# Patient Record
Sex: FEMALE | Race: ASIAN | NOT HISPANIC OR LATINO | URBAN - METROPOLITAN AREA
[De-identification: names, ages, dates, MRNs, and addresses within clinical notes are randomized per-mention and may not be internally consistent; named-entity substitution may affect disease eponyms.]

---

## 2020-09-09 ENCOUNTER — OFFICE VISIT (OUTPATIENT)
Dept: URBAN - METROPOLITAN AREA CLINIC 118 | Facility: CLINIC | Age: 50
End: 2020-09-09
Payer: COMMERCIAL

## 2020-09-09 DIAGNOSIS — R10.32 LEFT INGUINAL PAIN: ICD-10-CM

## 2020-09-09 PROBLEM — 15629941000119104: Status: ACTIVE | Noted: 2020-09-09

## 2020-09-09 PROCEDURE — 99244 OFF/OP CNSLTJ NEW/EST MOD 40: CPT | Performed by: INTERNAL MEDICINE

## 2020-09-09 PROCEDURE — 99204 OFFICE O/P NEW MOD 45 MIN: CPT | Performed by: INTERNAL MEDICINE

## 2020-09-09 RX ORDER — TRAZODONE HYDROCHLORIDE 50 MG/1
1 TABLET AT BEDTIME AS NEEDED TABLET, FILM COATED ORAL ONCE A DAY
Status: ACTIVE | COMMUNITY

## 2020-09-09 RX ORDER — PAROXETINE HYDROCHLORIDE HEMIHYDRATE 20 MG/1
1 TABLET IN THE MORNING TABLET, FILM COATED ORAL ONCE A DAY
Status: ACTIVE | COMMUNITY

## 2020-09-09 RX ORDER — ASPIRIN 325 MG
1 CAPSULE TABLET ORAL ONCE A DAY
Status: ACTIVE | COMMUNITY

## 2020-09-09 RX ORDER — LEVOTHYROXINE SODIUM 88 UG/1
1 TABLET IN THE MORNING ON AN EMPTY STOMACH TABLET ORAL ONCE A DAY
Status: ACTIVE | COMMUNITY

## 2020-09-09 NOTE — HPI-TODAY'S VISIT:
51 yo VF referred for evaluation of L inguinal pain starting ~ 7/4/20, sharp, acute onset, lasting 30', and better only with lying down.  Seems to occur with standing and working for several hours.  BMs usu qd, no change, no GI bleed. No GI bleed.  No N/V.  Has borborygmi.  Has had CT x 2 that were reportedly negative.  Pt has had problems with BP and HR, and has seen Cardio and Neuro.  Started on Paxil and trazodone with relief.

## 2021-01-29 ENCOUNTER — OFFICE VISIT (OUTPATIENT)
Dept: URBAN - METROPOLITAN AREA CLINIC 118 | Facility: CLINIC | Age: 51
End: 2021-01-29
Payer: COMMERCIAL

## 2021-01-29 DIAGNOSIS — R14.0 BLOATING: ICD-10-CM

## 2021-01-29 DIAGNOSIS — R14.3 EXCESSIVE GAS: ICD-10-CM

## 2021-01-29 DIAGNOSIS — R19.7 DIARRHEA OF PRESUMED INFECTIOUS ORIGIN: ICD-10-CM

## 2021-01-29 DIAGNOSIS — Z83.1 FAMILY HISTORY OF HEPATITIS B: ICD-10-CM

## 2021-01-29 DIAGNOSIS — R74.8 ELEVATED LIVER ENZYMES: ICD-10-CM

## 2021-01-29 PROCEDURE — G9903 PT SCRN TBCO ID AS NON USER: HCPCS | Performed by: INTERNAL MEDICINE

## 2021-01-29 PROCEDURE — 99213 OFFICE O/P EST LOW 20 MIN: CPT | Performed by: INTERNAL MEDICINE

## 2021-01-29 PROCEDURE — G8420 CALC BMI NORM PARAMETERS: HCPCS | Performed by: INTERNAL MEDICINE

## 2021-01-29 PROCEDURE — G8482 FLU IMMUNIZE ORDER/ADMIN: HCPCS | Performed by: INTERNAL MEDICINE

## 2021-01-29 PROCEDURE — G8427 DOCREV CUR MEDS BY ELIG CLIN: HCPCS | Performed by: INTERNAL MEDICINE

## 2021-01-29 PROCEDURE — 3017F COLORECTAL CA SCREEN DOC REV: CPT | Performed by: INTERNAL MEDICINE

## 2021-01-29 RX ORDER — TRAZODONE HYDROCHLORIDE 50 MG/1
1 TABLET AT BEDTIME AS NEEDED TABLET, FILM COATED ORAL ONCE A DAY
Status: ACTIVE | COMMUNITY

## 2021-01-29 RX ORDER — LEVOTHYROXINE SODIUM 0.09 MG/1
TABLET ORAL
Qty: 0 | Refills: 0 | Status: ACTIVE | COMMUNITY
Start: 1900-01-01

## 2021-01-29 RX ORDER — PAROXETINE HYDROCHLORIDE HEMIHYDRATE 10 MG/1
1 TABLET IN THE MORNING TABLET, FILM COATED ORAL ONCE A DAY
Status: ACTIVE | COMMUNITY

## 2021-01-29 NOTE — HPI-TODAY'S VISIT:
This is a 50 yo female with pmh of hypothyroidism and anxiety here for evaluation ofr abdominal bloating/gas and abdominal discomfort.  She had colonoscopy in 2018 with Dr. Thomas and a small TA polyp was removed.  She reports having frequent loose stools over the past several weeks. Has abdominal bloating and distension with everything that she eats. No rectal bleeding.  States both of her parents had hep B and wants to be checked for hep b and liver numbers.

## 2021-01-30 ENCOUNTER — LAB OUTSIDE AN ENCOUNTER (OUTPATIENT)
Dept: URBAN - METROPOLITAN AREA CLINIC 118 | Facility: CLINIC | Age: 51
End: 2021-01-30

## 2021-01-30 LAB
A/G RATIO: 1.3
ALBUMIN: 4.1
ALKALINE PHOSPHATASE: 45
ALT (SGPT): 16
AST (SGOT): 18
BILIRUBIN, TOTAL: <0.2
BUN/CREATININE RATIO: 27
BUN: 14
CALCIUM: 9.5
CARBON DIOXIDE, TOTAL: 26
CHLORIDE: 102
CREATININE: 0.52
EGFR IF AFRICN AM: 127
EGFR IF NONAFRICN AM: 110
GLOBULIN, TOTAL: 3.1
GLUCOSE: 106
HBSAG SCREEN: NEGATIVE
HEP B CORE AB, TOT: NEGATIVE
HEP C VIRUS AB: <0.1
POTASSIUM: 3.9
PROTEIN, TOTAL: 7.2
SODIUM: 139

## 2021-02-07 LAB — GASTROINTESTINAL PATHOGEN: (no result)

## 2021-03-15 ENCOUNTER — OFFICE VISIT (OUTPATIENT)
Dept: URBAN - METROPOLITAN AREA CLINIC 118 | Facility: CLINIC | Age: 51
End: 2021-03-15

## 2021-03-19 ENCOUNTER — OFFICE VISIT (OUTPATIENT)
Dept: URBAN - METROPOLITAN AREA CLINIC 118 | Facility: CLINIC | Age: 51
End: 2021-03-19
Payer: COMMERCIAL

## 2021-03-19 DIAGNOSIS — R14.0 BLOATING: ICD-10-CM

## 2021-03-19 DIAGNOSIS — R74.8 ELEVATED LIVER ENZYMES: ICD-10-CM

## 2021-03-19 DIAGNOSIS — Z83.1 FAMILY HISTORY OF HEPATITIS B: ICD-10-CM

## 2021-03-19 DIAGNOSIS — R14.3 EXCESSIVE GAS: ICD-10-CM

## 2021-03-19 DIAGNOSIS — R19.7 DIARRHEA OF PRESUMED INFECTIOUS ORIGIN: ICD-10-CM

## 2021-03-19 PROCEDURE — 99213 OFFICE O/P EST LOW 20 MIN: CPT | Performed by: INTERNAL MEDICINE

## 2021-03-19 RX ORDER — LEVOTHYROXINE SODIUM 0.09 MG/1
TABLET ORAL
Qty: 0 | Refills: 0 | Status: ACTIVE | COMMUNITY
Start: 1900-01-01

## 2021-03-19 RX ORDER — NORTRIPTYLINE HYDROCHLORIDE 10 MG/1
1 CAPSULE CAPSULE ORAL ONCE A DAY
Qty: 30 | Refills: 6 | OUTPATIENT
Start: 2021-03-19

## 2021-03-19 RX ORDER — TRAZODONE HYDROCHLORIDE 50 MG/1
1 TABLET AT BEDTIME AS NEEDED TABLET, FILM COATED ORAL ONCE A DAY
Status: ACTIVE | COMMUNITY

## 2021-03-19 RX ORDER — PAROXETINE HYDROCHLORIDE HEMIHYDRATE 10 MG/1
1 TABLET IN THE MORNING TABLET, FILM COATED ORAL ONCE A DAY
Status: ACTIVE | COMMUNITY

## 2021-03-19 NOTE — HPI-TODAY'S VISIT:
52 yo VF with IBS and abd bloating post-prandially.  She was started on Paxil, 10 mg qHS and trazodone and is better.  No L inguinal pain.  Weight stable.  No GI bleed.  BMs multiple x/d.

## 2021-03-23 ENCOUNTER — TELEPHONE ENCOUNTER (OUTPATIENT)
Dept: URBAN - METROPOLITAN AREA CLINIC 92 | Facility: CLINIC | Age: 51
End: 2021-03-23

## 2021-06-18 ENCOUNTER — OFFICE VISIT (OUTPATIENT)
Dept: URBAN - METROPOLITAN AREA CLINIC 118 | Facility: CLINIC | Age: 51
End: 2021-06-18
Payer: COMMERCIAL

## 2021-06-18 DIAGNOSIS — R74.8 ELEVATED LIVER ENZYMES: ICD-10-CM

## 2021-06-18 DIAGNOSIS — K58.0 IRRITABLE BOWEL SYNDROME WITH DIARRHEA: ICD-10-CM

## 2021-06-18 DIAGNOSIS — R14.0 BLOATING: ICD-10-CM

## 2021-06-18 PROCEDURE — 99213 OFFICE O/P EST LOW 20 MIN: CPT | Performed by: INTERNAL MEDICINE

## 2021-06-18 RX ORDER — TRAZODONE HYDROCHLORIDE 50 MG/1
1 TABLET AT BEDTIME AS NEEDED TABLET, FILM COATED ORAL ONCE A DAY
Status: ACTIVE | COMMUNITY

## 2021-06-18 RX ORDER — LEVOTHYROXINE SODIUM 0.09 MG/1
TABLET ORAL
Qty: 0 | Refills: 0 | Status: ACTIVE | COMMUNITY
Start: 1900-01-01

## 2021-06-18 RX ORDER — PAROXETINE HYDROCHLORIDE HEMIHYDRATE 10 MG/1
1 TABLET IN THE MORNING TABLET, FILM COATED ORAL ONCE A DAY
Status: ACTIVE | COMMUNITY

## 2021-06-18 RX ORDER — NORTRIPTYLINE HYDROCHLORIDE 10 MG/1
1 CAPSULE CAPSULE ORAL ONCE A DAY
Qty: 30 | Refills: 6 | OUTPATIENT

## 2021-06-18 RX ORDER — NORTRIPTYLINE HYDROCHLORIDE 10 MG/1
1 CAPSULE CAPSULE ORAL ONCE A DAY
Qty: 30 | Refills: 6 | Status: ON HOLD | COMMUNITY
Start: 2021-03-19

## 2021-06-18 NOTE — HPI-TODAY'S VISIT:
Pt here for follow up.  Having ~ 3 soft BMs/d, usu after meals.  Notes that milk and eggs seem to cause more diarrhea.  Tried stopping trazodone, but can't sleep.  However, pt feels heaviness in eyelids and fingers.  Weight stable.

## 2021-11-10 ENCOUNTER — OFFICE VISIT (OUTPATIENT)
Dept: URBAN - METROPOLITAN AREA CLINIC 118 | Facility: CLINIC | Age: 51
End: 2021-11-10
Payer: COMMERCIAL

## 2021-11-10 DIAGNOSIS — R74.8 ELEVATED LIVER ENZYMES: ICD-10-CM

## 2021-11-10 PROCEDURE — 99213 OFFICE O/P EST LOW 20 MIN: CPT | Performed by: INTERNAL MEDICINE

## 2021-11-10 RX ORDER — TRAZODONE HYDROCHLORIDE 50 MG/1
1 TABLET AT BEDTIME AS NEEDED TABLET, FILM COATED ORAL ONCE A DAY
Status: ACTIVE | COMMUNITY

## 2021-11-10 RX ORDER — NORTRIPTYLINE HYDROCHLORIDE 10 MG/1
1 CAPSULE CAPSULE ORAL ONCE A DAY
Qty: 30 | Refills: 6 | Status: ACTIVE | COMMUNITY

## 2021-11-10 RX ORDER — LEVOTHYROXINE SODIUM 0.09 MG/1
TABLET ORAL
Qty: 0 | Refills: 0 | Status: ACTIVE | COMMUNITY
Start: 1900-01-01

## 2021-11-10 RX ORDER — PAROXETINE HYDROCHLORIDE HEMIHYDRATE 10 MG/1
1 TABLET IN THE MORNING TABLET, FILM COATED ORAL ONCE A DAY
Status: ACTIVE | COMMUNITY

## 2021-11-10 RX ORDER — FEXOFENADINE HCL 60 MG/1
1 TABLET TABLET, FILM COATED ORAL TWICE A DAY
Status: ACTIVE | COMMUNITY

## 2021-11-10 NOTE — HPI-TODAY'S VISIT:
50 yo [place] female here for elevated AST/ALT - 28/49 on 10/12/21.  Hepatitis serologies negative.  U/S of liver normal.  Pt had elevated LFTs in the past that had resolved as of 1/2021.  No abd pain, N/V, weight loss.  Pt currently getting allergy shots.  Pt is on fexofenadine.  No EtOH.

## 2021-12-23 LAB
ALBUMIN: 4.2
ALKALINE PHOSPHATASE: 45
ALT (SGPT): 11
AST (SGOT): 14
BILIRUBIN, DIRECT: <0.1
BILIRUBIN, TOTAL: 0.3
PROTEIN, TOTAL: 7.2

## 2022-01-21 ENCOUNTER — LAB OUTSIDE AN ENCOUNTER (OUTPATIENT)
Dept: URBAN - METROPOLITAN AREA CLINIC 118 | Facility: CLINIC | Age: 52
End: 2022-01-21

## 2022-01-21 ENCOUNTER — OFFICE VISIT (OUTPATIENT)
Dept: URBAN - METROPOLITAN AREA CLINIC 118 | Facility: CLINIC | Age: 52
End: 2022-01-21
Payer: COMMERCIAL

## 2022-01-21 VITALS
HEIGHT: 59 IN | TEMPERATURE: 99.3 F | HEART RATE: 87 BPM | BODY MASS INDEX: 22.86 KG/M2 | WEIGHT: 113.4 LBS | SYSTOLIC BLOOD PRESSURE: 127 MMHG | DIASTOLIC BLOOD PRESSURE: 89 MMHG

## 2022-01-21 DIAGNOSIS — R05.9 COUGH: ICD-10-CM

## 2022-01-21 DIAGNOSIS — K58.0 IRRITABLE BOWEL SYNDROME WITH DIARRHEA: ICD-10-CM

## 2022-01-21 DIAGNOSIS — R74.8 ELEVATED LIVER ENZYMES: ICD-10-CM

## 2022-01-21 PROBLEM — 49727002: Status: ACTIVE | Noted: 2022-01-21

## 2022-01-21 PROBLEM — 707724006: Status: ACTIVE | Noted: 2022-01-21

## 2022-01-21 PROCEDURE — 99213 OFFICE O/P EST LOW 20 MIN: CPT | Performed by: INTERNAL MEDICINE

## 2022-01-21 RX ORDER — BENZONATATE 200 MG/1
1 CAPSULE CAPSULE ORAL THREE TIMES A DAY
Qty: 42 CAPSULE | Refills: 1 | OUTPATIENT
Start: 2022-01-21 | End: 2022-02-18

## 2022-01-21 RX ORDER — PAROXETINE HYDROCHLORIDE HEMIHYDRATE 10 MG/1
1 TABLET IN THE MORNING TABLET, FILM COATED ORAL ONCE A DAY
Status: ACTIVE | COMMUNITY

## 2022-01-21 RX ORDER — FEXOFENADINE HCL 60 MG/1
1 TABLET TABLET, FILM COATED ORAL TWICE A DAY
Status: ON HOLD | COMMUNITY

## 2022-01-21 RX ORDER — LEVOTHYROXINE SODIUM 0.09 MG/1
TABLET ORAL
Qty: 0 | Refills: 0 | Status: ACTIVE | COMMUNITY
Start: 1900-01-01

## 2022-01-21 RX ORDER — TRAZODONE HYDROCHLORIDE 50 MG/1
1 TABLET AT BEDTIME AS NEEDED TABLET, FILM COATED ORAL ONCE A DAY
Status: ACTIVE | COMMUNITY

## 2022-01-21 RX ORDER — NORTRIPTYLINE HYDROCHLORIDE 10 MG/1
1 CAPSULE CAPSULE ORAL ONCE A DAY
Qty: 30 | Refills: 6 | Status: ON HOLD | COMMUNITY

## 2022-01-21 NOTE — HPI-TODAY'S VISIT:
51 yo VF here for evaluation of possible reflux.  Pt saw Allergist complaining of dry throat awaking her with dry cough at 3 or 4AM.  Was dx'd with GERD, and given omeprazole, which caused stomach pain and she quit after 1 dose.  She then saw PCP and was given famotidine which she quit after 1 wk due to upper abd discomfort.  Discomfort stopped with cessation of pills. Has occ sour taste in mouth.  No dysphagia. LFTs normal as of 12/23/21.  Weight stable.

## 2022-01-24 ENCOUNTER — OFFICE VISIT (OUTPATIENT)
Dept: URBAN - METROPOLITAN AREA SURGERY CENTER 23 | Facility: SURGERY CENTER | Age: 52
End: 2022-01-24
Payer: COMMERCIAL

## 2022-01-24 DIAGNOSIS — R05.3 CHRONIC COUGH: ICD-10-CM

## 2022-01-24 DIAGNOSIS — K31.89 ACQUIRED DEFORMITY OF DUODENUM: ICD-10-CM

## 2022-01-24 PROCEDURE — G8907 PT DOC NO EVENTS ON DISCHARG: HCPCS | Performed by: INTERNAL MEDICINE

## 2022-01-24 PROCEDURE — 43239 EGD BIOPSY SINGLE/MULTIPLE: CPT | Performed by: INTERNAL MEDICINE

## 2022-01-24 RX ORDER — NORTRIPTYLINE HYDROCHLORIDE 10 MG/1
1 CAPSULE CAPSULE ORAL ONCE A DAY
Qty: 30 | Refills: 6 | Status: ON HOLD | COMMUNITY

## 2022-01-24 RX ORDER — FEXOFENADINE HCL 60 MG/1
1 TABLET TABLET, FILM COATED ORAL TWICE A DAY
Status: ON HOLD | COMMUNITY

## 2022-01-24 RX ORDER — BENZONATATE 200 MG/1
1 CAPSULE CAPSULE ORAL THREE TIMES A DAY
Qty: 42 CAPSULE | Refills: 1 | Status: ACTIVE | COMMUNITY
Start: 2022-01-21 | End: 2022-02-18

## 2022-01-24 RX ORDER — TRAZODONE HYDROCHLORIDE 50 MG/1
1 TABLET AT BEDTIME AS NEEDED TABLET, FILM COATED ORAL ONCE A DAY
Status: ACTIVE | COMMUNITY

## 2022-01-24 RX ORDER — LEVOTHYROXINE SODIUM 0.09 MG/1
TABLET ORAL
Qty: 0 | Refills: 0 | Status: ACTIVE | COMMUNITY
Start: 1900-01-01

## 2022-01-24 RX ORDER — PAROXETINE HYDROCHLORIDE HEMIHYDRATE 10 MG/1
1 TABLET IN THE MORNING TABLET, FILM COATED ORAL ONCE A DAY
Status: ACTIVE | COMMUNITY

## 2022-07-26 ENCOUNTER — OFFICE VISIT (OUTPATIENT)
Dept: URBAN - METROPOLITAN AREA CLINIC 118 | Facility: CLINIC | Age: 52
End: 2022-07-26
Payer: COMMERCIAL

## 2022-07-26 VITALS
DIASTOLIC BLOOD PRESSURE: 85 MMHG | BODY MASS INDEX: 23.18 KG/M2 | SYSTOLIC BLOOD PRESSURE: 122 MMHG | HEIGHT: 59 IN | WEIGHT: 115 LBS | HEART RATE: 86 BPM | TEMPERATURE: 98.4 F

## 2022-07-26 DIAGNOSIS — K58.0 IRRITABLE BOWEL SYNDROME WITH DIARRHEA: ICD-10-CM

## 2022-07-26 DIAGNOSIS — R12 HEARTBURN: ICD-10-CM

## 2022-07-26 DIAGNOSIS — R09.89 GLOBUS SENSATION: ICD-10-CM

## 2022-07-26 PROCEDURE — 99213 OFFICE O/P EST LOW 20 MIN: CPT | Performed by: INTERNAL MEDICINE

## 2022-07-26 RX ORDER — PAROXETINE HYDROCHLORIDE HEMIHYDRATE 10 MG/1
1 TABLET IN THE MORNING TABLET, FILM COATED ORAL ONCE A DAY
Status: ACTIVE | COMMUNITY

## 2022-07-26 RX ORDER — NORTRIPTYLINE HYDROCHLORIDE 10 MG/1
1 CAPSULE CAPSULE ORAL ONCE A DAY
Qty: 30 | Refills: 6 | Status: ON HOLD | COMMUNITY

## 2022-07-26 RX ORDER — LEVOTHYROXINE SODIUM 0.09 MG/1
TABLET ORAL
Qty: 0 | Refills: 0 | Status: ACTIVE | COMMUNITY
Start: 1900-01-01

## 2022-07-26 RX ORDER — FEXOFENADINE HCL 60 MG/1
1 TABLET TABLET, FILM COATED ORAL TWICE A DAY
Status: ON HOLD | COMMUNITY

## 2022-07-26 RX ORDER — TRAZODONE HYDROCHLORIDE 50 MG/1
1 TABLET AT BEDTIME AS NEEDED TABLET, FILM COATED ORAL ONCE A DAY
Status: ACTIVE | COMMUNITY

## 2022-07-26 NOTE — HPI-TODAY'S VISIT:
7/26/22 Pt here for evaluation of heartburn occurring q 2 wks.  Symptoms of upper substernal chest burning, as well as globus sensation in throat.  Saw Dr. Dolan and was given pantoprazole, which she took for 1 wk with relief, but gave her bloating.  Also, her psychiatrist wants to switch trazodone to amitriptyline at 50 mg qHS.  Pt states less stress.  Weight stable. EGD done 1/24/22 reviewed with pt. **************************** 1/21/22 - 51 yo VF here for evaluation of possible reflux.  Pt saw Allergist complaining of dry throat awaking her with dry cough at 3 or 4AM.  Was dx'd with GERD, and given omeprazole, which caused stomach pain and she quit after 1 dose.  She then saw PCP and was given famotidine which she quit after 1 wk due to upper abd discomfort.  Discomfort stopped with cessation of pills. Has occ sour taste in mouth.  No dysphagia. LFTs normal as of 12/23/21.  Weight stable.

## 2022-10-12 ENCOUNTER — TELEPHONE ENCOUNTER (OUTPATIENT)
Dept: URBAN - METROPOLITAN AREA CLINIC 118 | Facility: CLINIC | Age: 52
End: 2022-10-12

## 2022-10-18 ENCOUNTER — OFFICE VISIT (OUTPATIENT)
Dept: URBAN - METROPOLITAN AREA CLINIC 118 | Facility: CLINIC | Age: 52
End: 2022-10-18
Payer: COMMERCIAL

## 2022-10-18 ENCOUNTER — LAB OUTSIDE AN ENCOUNTER (OUTPATIENT)
Dept: URBAN - METROPOLITAN AREA CLINIC 118 | Facility: CLINIC | Age: 52
End: 2022-10-18

## 2022-10-18 VITALS
HEIGHT: 59 IN | SYSTOLIC BLOOD PRESSURE: 131 MMHG | DIASTOLIC BLOOD PRESSURE: 85 MMHG | BODY MASS INDEX: 22.18 KG/M2 | TEMPERATURE: 98.1 F | WEIGHT: 110 LBS | HEART RATE: 73 BPM

## 2022-10-18 DIAGNOSIS — R13.19 ESOPHAGEAL DYSPHAGIA: ICD-10-CM

## 2022-10-18 DIAGNOSIS — R09.89 GLOBUS SENSATION: ICD-10-CM

## 2022-10-18 DIAGNOSIS — K58.0 IRRITABLE BOWEL SYNDROME WITH DIARRHEA: ICD-10-CM

## 2022-10-18 PROCEDURE — 99213 OFFICE O/P EST LOW 20 MIN: CPT | Performed by: INTERNAL MEDICINE

## 2022-10-18 RX ORDER — TRAZODONE HYDROCHLORIDE 50 MG/1
1 TABLET AT BEDTIME AS NEEDED TABLET, FILM COATED ORAL ONCE A DAY
Status: ACTIVE | COMMUNITY

## 2022-10-18 RX ORDER — SUCRALFATE 1 G/1
1 TABLET ON AN EMPTY STOMACH TABLET ORAL TWICE A DAY
Qty: 60 | Refills: 6 | OUTPATIENT
Start: 2022-10-18 | End: 2023-05-16

## 2022-10-18 RX ORDER — LEVOTHYROXINE SODIUM 0.09 MG/1
TABLET ORAL
Qty: 0 | Refills: 0 | Status: ACTIVE | COMMUNITY
Start: 1900-01-01

## 2022-10-18 RX ORDER — PAROXETINE HYDROCHLORIDE HEMIHYDRATE 10 MG/1
1 TABLET IN THE MORNING TABLET, FILM COATED ORAL ONCE A DAY
Status: ACTIVE | COMMUNITY

## 2022-10-18 RX ORDER — FEXOFENADINE HCL 60 MG/1
1 TABLET TABLET, FILM COATED ORAL TWICE A DAY
Status: ON HOLD | COMMUNITY

## 2022-10-18 RX ORDER — NORTRIPTYLINE HYDROCHLORIDE 10 MG/1
1 CAPSULE CAPSULE ORAL ONCE A DAY
Qty: 30 | Refills: 6 | Status: ON HOLD | COMMUNITY

## 2022-10-18 NOTE — HPI-TODAY'S VISIT:
10/18/22 - Pt here for follow up.  Had some chest symptoms and possible arrythmia, and is currently on a heart monitor.  She has seen Dr. Dolan again, as well as seeing Cardiology and Pulmonary.  She has a sensation of food in her throat, as well as food getting stuck in her chest.  She also complains of SOB, and fatigue.  She saw ENT and was told she had reflux.  PPIs have not helped.  She has burping frequently. **************************** 7/26/22 Pt here for evaluation of heartburn occurring q 2 wks.  Symptoms of upper substernal chest burning, as well as globus sensation in throat.  Saw Dr. Dolan and was given pantoprazole, which she took for 1 wk with relief, but gave her bloating.  Also, her psychiatrist wants to switch trazodone to amitriptyline at 50 mg qHS.  Pt states less stress.  Weight stable. EGD done 1/24/22 reviewed with pt. **************************** 1/21/22 - 51 yo VF here for evaluation of possible reflux.  Pt saw Allergist complaining of dry throat awaking her with dry cough at 3 or 4AM.  Was dx'd with GERD, and given omeprazole, which caused stomach pain and she quit after 1 dose.  She then saw PCP and was given famotidine which she quit after 1 wk due to upper abd discomfort.  Discomfort stopped with cessation of pills. Has occ sour taste in mouth.  No dysphagia. LFTs normal as of 12/23/21.  Weight stable.

## 2022-12-16 ENCOUNTER — OFFICE VISIT (OUTPATIENT)
Dept: URBAN - METROPOLITAN AREA CLINIC 118 | Facility: CLINIC | Age: 52
End: 2022-12-16
Payer: COMMERCIAL

## 2022-12-16 VITALS
HEART RATE: 71 BPM | HEIGHT: 59 IN | TEMPERATURE: 97.7 F | SYSTOLIC BLOOD PRESSURE: 124 MMHG | BODY MASS INDEX: 22.5 KG/M2 | DIASTOLIC BLOOD PRESSURE: 82 MMHG | WEIGHT: 111.6 LBS

## 2022-12-16 DIAGNOSIS — R09.89 GLOBUS SENSATION: ICD-10-CM

## 2022-12-16 DIAGNOSIS — R13.19 ESOPHAGEAL DYSPHAGIA: ICD-10-CM

## 2022-12-16 DIAGNOSIS — K58.0 IRRITABLE BOWEL SYNDROME WITH DIARRHEA: ICD-10-CM

## 2022-12-16 PROBLEM — 197125005: Status: ACTIVE | Noted: 2021-06-18

## 2022-12-16 PROBLEM — 40890009: Status: ACTIVE | Noted: 2022-10-18

## 2022-12-16 PROCEDURE — 99213 OFFICE O/P EST LOW 20 MIN: CPT | Performed by: INTERNAL MEDICINE

## 2022-12-16 RX ORDER — LEVOTHYROXINE SODIUM 75 UG/1
1 TABLET IN THE MORNING ON AN EMPTY STOMACH TABLET ORAL
Refills: 0 | Status: ACTIVE | COMMUNITY
Start: 1900-01-01

## 2022-12-16 RX ORDER — NORTRIPTYLINE HYDROCHLORIDE 10 MG/1
1 CAPSULE CAPSULE ORAL ONCE A DAY
Qty: 30 | Refills: 6 | Status: ON HOLD | COMMUNITY

## 2022-12-16 RX ORDER — TRAZODONE HYDROCHLORIDE 50 MG/1
1 TABLET AT BEDTIME AS NEEDED TABLET, FILM COATED ORAL ONCE A DAY
Status: ACTIVE | COMMUNITY

## 2022-12-16 RX ORDER — PAROXETINE HYDROCHLORIDE HEMIHYDRATE 10 MG/1
1 TABLET IN THE MORNING TABLET, FILM COATED ORAL ONCE A DAY
Status: ACTIVE | COMMUNITY

## 2022-12-16 RX ORDER — SUCRALFATE 1 G/1
1 TABLET ON AN EMPTY STOMACH TABLET ORAL TWICE A DAY
Qty: 60 | Refills: 6 | Status: ON HOLD | COMMUNITY
Start: 2022-10-18 | End: 2023-05-16

## 2022-12-16 RX ORDER — ASPIRIN 81 MG/1
1 TABLET TABLET, COATED ORAL ONCE A DAY
Status: ACTIVE | COMMUNITY

## 2022-12-16 RX ORDER — FEXOFENADINE HCL 60 MG/1
1 TABLET TABLET, FILM COATED ORAL TWICE A DAY
Status: ON HOLD | COMMUNITY

## 2022-12-16 NOTE — HPI-TODAY'S VISIT:
12/16/22 - Pt here for follow up.  Barium swallow is doing well.  She was found to be hyperthyroid on replacement, and dose was dropped and is being adjusted.  With the drop of her dosage, a lot of her symptoms have resolved.  Her UGI symptoms are resolved.  She has noted some hair loss. ------------------------------------------------ 10/18/22 - Pt here for follow up.  Had some chest symptoms and possible arrythmia, and is currently on a heart monitor.  She has seen Dr. Dolan again, as well as seeing Cardiology and Pulmonary.  She has a sensation of food in her throat, as well as food getting stuck in her chest.  She also complains of SOB, and fatigue.  She saw ENT and was told she had reflux.  PPIs have not helped.  She has burping frequently. **************************** 7/26/22 Pt here for evaluation of heartburn occurring q 2 wks.  Symptoms of upper substernal chest burning, as well as globus sensation in throat.  Saw Dr. Dolan and was given pantoprazole, which she took for 1 wk with relief, but gave her bloating.  Also, her psychiatrist wants to switch trazodone to amitriptyline at 50 mg qHS.  Pt states less stress.  Weight stable. EGD done 1/24/22 reviewed with pt. **************************** 1/21/22 - 53 yo VF here for evaluation of possible reflux.  Pt saw Allergist complaining of dry throat awaking her with dry cough at 3 or 4AM.  Was dx'd with GERD, and given omeprazole, which caused stomach pain and she quit after 1 dose.  She then saw PCP and was given famotidine which she quit after 1 wk due to upper abd discomfort.  Discomfort stopped with cessation of pills. Has occ sour taste in mouth.  No dysphagia. LFTs normal as of 12/23/21.  Weight stable.

## 2023-06-30 ENCOUNTER — OFFICE VISIT (OUTPATIENT)
Dept: URBAN - METROPOLITAN AREA CLINIC 118 | Facility: CLINIC | Age: 53
End: 2023-06-30
Payer: COMMERCIAL

## 2023-06-30 VITALS
DIASTOLIC BLOOD PRESSURE: 80 MMHG | BODY MASS INDEX: 22.78 KG/M2 | TEMPERATURE: 97.7 F | WEIGHT: 113 LBS | HEART RATE: 85 BPM | HEIGHT: 59 IN | SYSTOLIC BLOOD PRESSURE: 119 MMHG

## 2023-06-30 DIAGNOSIS — R09.89 GLOBUS SENSATION: ICD-10-CM

## 2023-06-30 DIAGNOSIS — R12 HEARTBURN: ICD-10-CM

## 2023-06-30 PROBLEM — 16331000: Status: ACTIVE | Noted: 2022-07-26

## 2023-06-30 PROCEDURE — 99214 OFFICE O/P EST MOD 30 MIN: CPT | Performed by: INTERNAL MEDICINE

## 2023-06-30 RX ORDER — ASPIRIN 81 MG/1
1 TABLET TABLET, COATED ORAL ONCE A DAY
Status: ACTIVE | COMMUNITY

## 2023-06-30 RX ORDER — FAMOTIDINE 40 MG/1
1 TABLET AT BEDTIME TABLET, FILM COATED ORAL ONCE A DAY
Status: ACTIVE | COMMUNITY

## 2023-06-30 RX ORDER — TRAZODONE HYDROCHLORIDE 50 MG/1
1 TABLET AT BEDTIME AS NEEDED TABLET, FILM COATED ORAL ONCE A DAY
Status: ACTIVE | COMMUNITY

## 2023-06-30 RX ORDER — LEVOTHYROXINE SODIUM 75 UG/1
1 TABLET IN THE MORNING ON AN EMPTY STOMACH TABLET ORAL
Refills: 0 | Status: ACTIVE | COMMUNITY
Start: 1900-01-01

## 2023-06-30 RX ORDER — FEXOFENADINE HCL 60 MG/1
1 TABLET TABLET, FILM COATED ORAL TWICE A DAY
Status: ON HOLD | COMMUNITY

## 2023-06-30 RX ORDER — NORTRIPTYLINE HYDROCHLORIDE 10 MG/1
1 CAPSULE CAPSULE ORAL ONCE A DAY
Qty: 30 | Refills: 6 | Status: ON HOLD | COMMUNITY

## 2023-06-30 RX ORDER — PAROXETINE HYDROCHLORIDE HEMIHYDRATE 10 MG/1
1 TABLET IN THE MORNING TABLET, FILM COATED ORAL ONCE A DAY
Status: ACTIVE | COMMUNITY

## 2023-06-30 NOTE — HPI-TODAY'S VISIT:
6/30/23 - Pt here for 2 wk hx of throat burning and like something is caught in throat.  Feels better if eats ~ 2 hrs.  Occ heartburn, and is burping a lot.  Has also noted abd bloating, better with famotidine.  Pt dealing with father's recent dx of lung cancer.  On trazodone and Paxil, and sleeping ~ 7 hours/d. 12/16/22 - Pt here for follow up.  Barium swallow is doing well.  She was found to be hyperthyroid on replacement, and dose was dropped and is being adjusted.  With the drop of her dosage, a lot of her symptoms have resolved.  Her UGI symptoms are resolved.  She has noted some hair loss. ------------------------------------------------ 10/18/22 - Pt here for follow up.  Had some chest symptoms and possible arrythmia, and is currently on a heart monitor.  She has seen Dr. Dolan again, as well as seeing Cardiology and Pulmonary.  She has a sensation of food in her throat, as well as food getting stuck in her chest.  She also complains of SOB, and fatigue.  She saw ENT and was told she had reflux.  PPIs have not helped.  She has burping frequently. **************************** 7/26/22 Pt here for evaluation of heartburn occurring q 2 wks.  Symptoms of upper substernal chest burning, as well as globus sensation in throat.  Saw Dr. Dolan and was given pantoprazole, which she took for 1 wk with relief, but gave her bloating.  Also, her psychiatrist wants to switch trazodone to amitriptyline at 50 mg qHS.  Pt states less stress.  Weight stable. EGD done 1/24/22 reviewed with pt. **************************** 1/21/22 - 53 yo VF here for evaluation of possible reflux.  Pt saw Allergist complaining of dry throat awaking her with dry cough at 3 or 4AM.  Was dx'd with GERD, and given omeprazole, which caused stomach pain and she quit after 1 dose.  She then saw PCP and was given famotidine which she quit after 1 wk due to upper abd discomfort.  Discomfort stopped with cessation of pills. Has occ sour taste in mouth.  No dysphagia. LFTs normal as of 12/23/21.  Weight stable.

## 2023-07-21 ENCOUNTER — TELEPHONE ENCOUNTER (OUTPATIENT)
Dept: URBAN - METROPOLITAN AREA CLINIC 118 | Facility: CLINIC | Age: 53
End: 2023-07-21

## 2023-07-21 RX ORDER — PANTOPRAZOLE SODIUM 40 MG/1
1 TABLET TABLET, DELAYED RELEASE ORAL ONCE A DAY
Qty: 30 | Refills: 5 | OUTPATIENT
Start: 2023-07-21

## 2023-09-06 ENCOUNTER — TELEPHONE ENCOUNTER (OUTPATIENT)
Dept: URBAN - METROPOLITAN AREA CLINIC 118 | Facility: CLINIC | Age: 53
End: 2023-09-06

## 2023-09-12 ENCOUNTER — LAB OUTSIDE AN ENCOUNTER (OUTPATIENT)
Dept: URBAN - METROPOLITAN AREA CLINIC 118 | Facility: CLINIC | Age: 53
End: 2023-09-12

## 2023-09-12 PROBLEM — 428283002: Status: ACTIVE | Noted: 2023-09-12

## 2023-11-08 ENCOUNTER — OFFICE VISIT (OUTPATIENT)
Dept: URBAN - METROPOLITAN AREA MEDICAL CENTER 16 | Facility: MEDICAL CENTER | Age: 53
End: 2023-11-08
Payer: COMMERCIAL

## 2023-11-08 DIAGNOSIS — Z09 CARDIOLOGY FOLLOW-UP ENCOUNTER: ICD-10-CM

## 2023-11-08 DIAGNOSIS — Z86.010 ADENOMAS PERSONAL HISTORY OF COLONIC POLYPS: ICD-10-CM

## 2023-11-08 PROCEDURE — 45378 DIAGNOSTIC COLONOSCOPY: CPT | Performed by: INTERNAL MEDICINE

## 2024-02-01 ENCOUNTER — OV EP (OUTPATIENT)
Dept: URBAN - METROPOLITAN AREA CLINIC 118 | Facility: CLINIC | Age: 54
End: 2024-02-01
Payer: COMMERCIAL

## 2024-02-01 VITALS
BODY MASS INDEX: 23.18 KG/M2 | DIASTOLIC BLOOD PRESSURE: 85 MMHG | SYSTOLIC BLOOD PRESSURE: 123 MMHG | HEART RATE: 76 BPM | TEMPERATURE: 97.7 F | WEIGHT: 115 LBS | HEIGHT: 59 IN

## 2024-02-01 DIAGNOSIS — R12 HEARTBURN: ICD-10-CM

## 2024-02-01 DIAGNOSIS — R09.89 GLOBUS SENSATION: ICD-10-CM

## 2024-02-01 PROBLEM — 267103008: Status: ACTIVE | Noted: 2022-07-26

## 2024-02-01 PROCEDURE — 99213 OFFICE O/P EST LOW 20 MIN: CPT | Performed by: INTERNAL MEDICINE

## 2024-02-01 RX ORDER — ASPIRIN 81 MG/1
1 TABLET TABLET, COATED ORAL ONCE A DAY
Status: ACTIVE | COMMUNITY

## 2024-02-01 RX ORDER — TRAZODONE HYDROCHLORIDE 50 MG/1
1 TABLET AT BEDTIME AS NEEDED TABLET, FILM COATED ORAL ONCE A DAY
Status: ACTIVE | COMMUNITY

## 2024-02-01 RX ORDER — FAMOTIDINE 40 MG/1
1 TABLET AT BEDTIME TABLET, FILM COATED ORAL ONCE A DAY
Status: ACTIVE | COMMUNITY

## 2024-02-01 RX ORDER — PANTOPRAZOLE SODIUM 40 MG/1
1 TABLET TABLET, DELAYED RELEASE ORAL ONCE A DAY
Qty: 30 | Refills: 5 | Status: ACTIVE | COMMUNITY
Start: 2023-07-21

## 2024-02-01 RX ORDER — FEXOFENADINE HCL 60 MG/1
1 TABLET TABLET, FILM COATED ORAL TWICE A DAY
Status: ON HOLD | COMMUNITY

## 2024-02-01 RX ORDER — LEVOTHYROXINE SODIUM 75 UG/1
1 TABLET IN THE MORNING ON AN EMPTY STOMACH TABLET ORAL
Refills: 0 | Status: ACTIVE | COMMUNITY
Start: 1900-01-01

## 2024-02-01 RX ORDER — NORTRIPTYLINE HYDROCHLORIDE 10 MG/1
1 CAPSULE CAPSULE ORAL ONCE A DAY
Qty: 30 | Refills: 6 | Status: ON HOLD | COMMUNITY

## 2024-02-01 RX ORDER — ALPRAZOLAM 0.5 MG/1
1 TABLET TABLET ORAL TWICE A DAY
Status: ACTIVE | COMMUNITY

## 2024-02-01 NOTE — HPI-TODAY'S VISIT:
2/1/24 - Pt here for follow up.  Stopped Paxil, and about to start fluoxetine.  Has occ bouts of anxiety.  Currently seeing doctors almost daily, and notes her thyroid is difficult to manage.  Dx'd with mild sleep apnea.  Using .  Using trazodone, 75 mg qHS, to sleep.  Weight up 2# since last visit. Has globus sensation at times.  Uses occ Xanax. -------------------------- 6/30/23 - Pt here for 2 wk hx of throat burning and like something is caught in throat.  Feels better if eats ~ 2 hrs.  Occ heartburn, and is burping a lot.  Has also noted abd bloating, better with famotidine.  Pt dealing with father's recent dx of lung cancer.  On trazodone and Paxil, and sleeping ~ 7 hours/d. 12/16/22 - Pt here for follow up.  Barium swallow is doing well.  She was found to be hyperthyroid on replacement, and dose was dropped and is being adjusted.  With the drop of her dosage, a lot of her symptoms have resolved.  Her UGI symptoms are resolved.  She has noted some hair loss. ------------------------------------------------ 10/18/22 - Pt here for follow up.  Had some chest symptoms and possible arrythmia, and is currently on a heart monitor.  She has seen Dr. Dolan again, as well as seeing Cardiology and Pulmonary.  She has a sensation of food in her throat, as well as food getting stuck in her chest.  She also complains of SOB, and fatigue.  She saw ENT and was told she had reflux.  PPIs have not helped.  She has burping frequently. **************************** .

## 2024-12-06 ENCOUNTER — OFFICE VISIT (OUTPATIENT)
Dept: URBAN - METROPOLITAN AREA CLINIC 118 | Facility: CLINIC | Age: 54
End: 2024-12-06
Payer: COMMERCIAL

## 2024-12-06 ENCOUNTER — DASHBOARD ENCOUNTERS (OUTPATIENT)
Age: 54
End: 2024-12-06

## 2024-12-06 VITALS
HEART RATE: 93 BPM | DIASTOLIC BLOOD PRESSURE: 81 MMHG | TEMPERATURE: 97.3 F | HEIGHT: 59 IN | SYSTOLIC BLOOD PRESSURE: 126 MMHG | WEIGHT: 113.2 LBS | BODY MASS INDEX: 22.82 KG/M2

## 2024-12-06 DIAGNOSIS — R09.89 GLOBUS SENSATION: ICD-10-CM

## 2024-12-06 DIAGNOSIS — R12 HEARTBURN: ICD-10-CM

## 2024-12-06 PROCEDURE — 99214 OFFICE O/P EST MOD 30 MIN: CPT | Performed by: INTERNAL MEDICINE

## 2024-12-06 RX ORDER — ALPRAZOLAM 0.5 MG/1
1 TABLET TABLET ORAL TWICE A DAY
Status: ACTIVE | COMMUNITY

## 2024-12-06 RX ORDER — ASPIRIN 81 MG/1
1 TABLET TABLET, COATED ORAL ONCE A DAY
Status: ACTIVE | COMMUNITY

## 2024-12-06 RX ORDER — PANTOPRAZOLE SODIUM 40 MG/1
1 TABLET TABLET, DELAYED RELEASE ORAL ONCE A DAY
Qty: 30 | Refills: 5 | Status: ACTIVE | COMMUNITY
Start: 2023-07-21

## 2024-12-06 RX ORDER — FAMOTIDINE 40 MG/1
1 TABLET AT BEDTIME TABLET, FILM COATED ORAL ONCE A DAY
Status: ACTIVE | COMMUNITY

## 2024-12-06 RX ORDER — FAMOTIDINE 40 MG/1
1 TABLET TABLET, FILM COATED ORAL ONCE A DAY
Qty: 90 TABLET | Refills: 1

## 2024-12-06 RX ORDER — FEXOFENADINE HCL 60 MG/1
1 TABLET TABLET, FILM COATED ORAL TWICE A DAY
Status: ON HOLD | COMMUNITY

## 2024-12-06 RX ORDER — NORTRIPTYLINE HYDROCHLORIDE 10 MG/1
1 CAPSULE CAPSULE ORAL ONCE A DAY
Qty: 30 | Refills: 6 | Status: ON HOLD | COMMUNITY

## 2024-12-06 RX ORDER — LEVOTHYROXINE SODIUM 75 UG/1
1 TABLET IN THE MORNING ON AN EMPTY STOMACH TABLET ORAL
Refills: 0 | Status: ACTIVE | COMMUNITY
Start: 1900-01-01

## 2024-12-06 RX ORDER — TRAZODONE HYDROCHLORIDE 50 MG/1
1 TABLET AT BEDTIME AS NEEDED TABLET ORAL ONCE A DAY
Status: ACTIVE | COMMUNITY

## 2024-12-06 NOTE — PHYSICAL EXAM HENT:
"  Subjective:       Patient ID:  Naomy Armstrong is a 56 y.o. female who presents for   Chief Complaint   Patient presents with    Skin Check     Pt presents today for UBSE     HPI    Patient is here today for a "mole" check.   Pt has a history of  severe sun exposure in the past.   Pt recalls several blistering sunburns in the past- yes  Pt has history of tanning bed use- no  Pt has  had moles removed in the past- no  Pt has history of melanoma in first degree relatives-  Not that pt is aware of    Review of Systems   Constitutional: Negative for fever, chills, weight loss, weight gain, fatigue, night sweats and malaise.   Skin: Positive for activity-related sunscreen use. Negative for itching, rash, daily sunscreen use, recent sunburn and wears hat.   Hematologic/Lymphatic: Bruises/bleeds easily.        Objective:    Physical Exam   Constitutional: She appears well-developed and well-nourished.   Neurological: She is alert and oriented to person, place, and time.   Psychiatric: She has a normal mood and affect.   Skin:   Areas Examined (abnormalities noted in diagram):   Scalp / Hair Palpated and Inspected  Head / Face Inspection Performed  Neck Inspection Performed  Chest / Axilla Inspection Performed  Abdomen Inspection Performed  Back Inspection Performed  RUE Inspected  LUE Inspection Performed                   Diagram Legend     Erythematous scaling macule/papule c/w actinic keratosis       Vascular papule c/w angioma      Pigmented verrucoid papule/plaque c/w seborrheic keratosis      Yellow umbilicated papule c/w sebaceous hyperplasia      Irregularly shaped tan macule c/w lentigo     1-2 mm smooth white papules consistent with Milia      Movable subcutaneous cyst with punctum c/w epidermal inclusion cyst      Subcutaneous movable cyst c/w pilar cyst      Firm pink to brown papule c/w dermatofibroma      Pedunculated fleshy papule(s) c/w skin tag(s)      Evenly pigmented macule c/w junctional " Head,  normocephalic,  atraumatic nevus     Mildly variegated pigmented, slightly irregular-bordered macule c/w mildly atypical nevus      Flesh colored to evenly pigmented papule c/w intradermal nevus       Pink pearly papule/plaque c/w basal cell carcinoma      Erythematous hyperkeratotic cursted plaque c/w SCC      Surgical scar with no sign of skin cancer recurrence      Open and closed comedones      Inflammatory papules and pustules      Verrucoid papule consistent consistent with wart     Erythematous eczematous patches and plaques     Dystrophic onycholytic nail with subungual debris c/w onychomycosis     Umbilicated papule    Erythematous-base heme-crusted tan verrucoid plaque consistent with inflamed seborrheic keratosis     Erythematous Silvery Scaling Plaque c/w Psoriasis     See annotation      Assessment / Plan:        SK (seborrheic keratosis) - These are benign inherited growths without a malignant potential. Reassurance given to patient. No treatment is necessary.       Skin lesion  - SK as above  -     Ambulatory referral/consult to Dermatology    Atypical mole - SK as above  -     Ambulatory referral/consult to Dermatology    Cherry angioma - This is a benign vascular lesion. Reassurance given. No treatment required.       Multiple benign nevi - Patient with several benign appearing nevi. Instructed patient to observe lesion(s) for changes and follow up in clinic if changes are noted.       Screening exam for skin cancer - Upper body skin examination performed today including at least 6 points as noted in physical examination. No lesions suspicious for malignancy noted.               No follow-ups on file.

## 2024-12-06 NOTE — HPI-TODAY'S VISIT:
12/6/24 - 53 yo VF here for symptoms of GERD.  Pt's daughter dx'd 10/2/24 with stage 3B immature teratoma, resected at [facility], and is getting 3 rounds of chemorx at [place].  Pt under a lot of stress with this.  Having chest discomfort and tachycardia, for which she was given metoprolol.  She complains of regurgitation.  Labs reviewed. - - - - - - - - - 2/1/24 - Pt here for follow up.  Stopped Paxil, and about to start fluoxetine.  Has occ bouts of anxiety.  Currently seeing doctors almost daily, and notes her thyroid is difficult to manage.  Dx'd with mild sleep apnea.  Using .  Using trazodone, 75 mg qHS, to sleep.  Weight up 2# since last visit. Has globus sensation at times.  Uses occ Xanax. - - - - - - - - - - - - -- 6/30/23 - Pt here for 2 wk hx of throat burning and like something is caught in throat.  Feels better if eats ~ 2 hrs.  Occ heartburn, and is burping a lot.  Has also noted abd bloating, better with famotidine.  Pt dealing with father's recent dx of lung cancer.  On trazodone and Paxil, and sleeping ~ 7 hours/d. 12/16/22 - Pt here for follow up.  Barium swallow is doing well.  She was found to be hyperthyroid on replacement, and dose was dropped and is being adjusted.  With the drop of her dosage, a lot of her symptoms have resolved.  Her UGI symptoms are resolved.  She has noted some hair loss. - - - - - - - - - - - - - - - - - - - - - - - -  10/18/22 - Pt here for follow up.  Had some chest symptoms and possible arrythmia, and is currently on a heart monitor.  She has seen Dr. Dolan again, as well as seeing Cardiology and Pulmonary.  She has a sensation of food in her throat, as well as food getting stuck in her chest.  She also complains of SOB, and fatigue.  She saw ENT and was told she had reflux.  PPIs have not helped.  She has burping frequently. **************************** .

## 2025-01-22 ENCOUNTER — OFFICE VISIT (OUTPATIENT)
Dept: URBAN - METROPOLITAN AREA CLINIC 118 | Facility: CLINIC | Age: 55
End: 2025-01-22

## 2025-06-23 ENCOUNTER — LAB OUTSIDE AN ENCOUNTER (OUTPATIENT)
Dept: URBAN - METROPOLITAN AREA CLINIC 118 | Facility: CLINIC | Age: 55
End: 2025-06-23

## 2025-06-23 ENCOUNTER — OFFICE VISIT (OUTPATIENT)
Dept: URBAN - METROPOLITAN AREA CLINIC 118 | Facility: CLINIC | Age: 55
End: 2025-06-23
Payer: COMMERCIAL

## 2025-06-23 DIAGNOSIS — R77.2 ELEVATED AFP: ICD-10-CM

## 2025-06-23 PROBLEM — 166561008: Status: ACTIVE | Noted: 2025-06-23

## 2025-06-23 PROCEDURE — 99214 OFFICE O/P EST MOD 30 MIN: CPT | Performed by: INTERNAL MEDICINE

## 2025-06-23 RX ORDER — FAMOTIDINE 40 MG/1
1 TABLET TABLET, FILM COATED ORAL ONCE A DAY
Qty: 90 TABLET | Refills: 1 | Status: ACTIVE | COMMUNITY

## 2025-06-23 RX ORDER — ALPRAZOLAM 0.5 MG/1
1 TABLET TABLET ORAL TWICE A DAY
Status: ACTIVE | COMMUNITY

## 2025-06-23 RX ORDER — PANTOPRAZOLE SODIUM 40 MG/1
1 TABLET TABLET, DELAYED RELEASE ORAL ONCE A DAY
Qty: 30 | Refills: 5 | Status: ACTIVE | COMMUNITY
Start: 2023-07-21

## 2025-06-23 RX ORDER — FEXOFENADINE HCL 60 MG/1
1 TABLET TABLET, FILM COATED ORAL TWICE A DAY
Status: ON HOLD | COMMUNITY

## 2025-06-23 RX ORDER — LEVOTHYROXINE SODIUM 75 UG/1
1 TABLET IN THE MORNING ON AN EMPTY STOMACH TABLET ORAL
Refills: 0 | Status: ACTIVE | COMMUNITY
Start: 1900-01-01

## 2025-06-23 RX ORDER — TRAZODONE HYDROCHLORIDE 50 MG/1
1 TABLET AT BEDTIME AS NEEDED TABLET ORAL ONCE A DAY
Status: ACTIVE | COMMUNITY

## 2025-06-23 RX ORDER — ASPIRIN 81 MG/1
1 TABLET TABLET, COATED ORAL ONCE A DAY
Status: ACTIVE | COMMUNITY

## 2025-06-23 RX ORDER — NORTRIPTYLINE HYDROCHLORIDE 10 MG/1
1 CAPSULE CAPSULE ORAL ONCE A DAY
Qty: 30 | Refills: 6 | Status: ON HOLD | COMMUNITY

## 2025-06-23 NOTE — HPI-TODAY'S VISIT:
6/23/25 - 54 yo VF here for evaluation of elevated AFP to 15, and has HBV RNA negative, with normal LFTs and undetectable viral load.  LFTs normal.  Was told by PCP to go see GI as she might have cancer.  Pt is very anxious about this.  Pt had Hep BsAb positive.  HCV Ab negative.  No hx of treatment. No abd pain, N/V, jaundice, weight loss. ---------------- 12/6/24 - 53 yo VF here for symptoms of GERD.  Pt's daughter dx'd 10/2/24 with stage 3B immature teratoma, resected at [facility], and is getting 3 rounds of chemorx at [place].  Pt under a lot of stress with this.  Having chest discomfort and tachycardia, for which she was given metoprolol.  She complains of regurgitation.  Labs reviewed. - - - - - - - - - 2/1/24 - Pt here for follow up.  Stopped Paxil, and about to start fluoxetine.  Has occ bouts of anxiety.  Currently seeing doctors almost daily, and notes her thyroid is difficult to manage.  Dx'd with mild sleep apnea.  Using .  Using trazodone, 75 mg qHS, to sleep.  Weight up 2# since last visit. Has globus sensation at times.  Uses occ Xanax. - - - - - - - - - - - - -- 6/30/23 - Pt here for 2 wk hx of throat burning and like something is caught in throat.  Feels better if eats ~ 2 hrs.  Occ heartburn, and is burping a lot.  Has also noted abd bloating, better with famotidine.  Pt dealing with father's recent dx of lung cancer.  On trazodone and Paxil, and sleeping ~ 7 hours/d. 12/16/22 - Pt here for follow up.  Barium swallow is doing well.  She was found to be hyperthyroid on replacement, and dose was dropped and is being adjusted.  With the drop of her dosage, a lot of her symptoms have resolved.  Her UGI symptoms are resolved.  She has noted some hair loss. - - - - - - - - - - - - - - - - - - - - - - - -  10/18/22 - Pt here for follow up.  Had some chest symptoms and possible arrythmia, and is currently on a heart monitor.  She has seen Dr. Dolan again, as well as seeing Cardiology and Pulmonary.  She has a sensation of food in her throat, as well as food getting stuck in her chest.  She also complains of SOB, and fatigue.  She saw ENT and was told she had reflux.  PPIs have not helped.  She has burping frequently. **************************** .

## 2025-06-25 LAB
HEPATITIS B CORE AB TOTAL: (no result)
HEPATITIS B SURFACE ANTIGEN: (no result)

## 2025-07-09 ENCOUNTER — OFFICE VISIT (OUTPATIENT)
Dept: URBAN - METROPOLITAN AREA CLINIC 118 | Facility: CLINIC | Age: 55
End: 2025-07-09
Payer: COMMERCIAL

## 2025-07-09 DIAGNOSIS — R77.2 ELEVATED AFP: ICD-10-CM

## 2025-07-09 DIAGNOSIS — R05.9 COUGH: ICD-10-CM

## 2025-07-09 PROCEDURE — 99213 OFFICE O/P EST LOW 20 MIN: CPT | Performed by: INTERNAL MEDICINE

## 2025-07-09 RX ORDER — FAMOTIDINE 40 MG/1
1 TABLET TABLET, FILM COATED ORAL ONCE A DAY
Qty: 90 TABLET | Refills: 1 | Status: ACTIVE | COMMUNITY

## 2025-07-09 RX ORDER — ASPIRIN 81 MG/1
1 TABLET TABLET, COATED ORAL ONCE A DAY
Status: ACTIVE | COMMUNITY

## 2025-07-09 RX ORDER — LEVOTHYROXINE SODIUM 75 UG/1
1 TABLET IN THE MORNING ON AN EMPTY STOMACH TABLET ORAL
Refills: 0 | Status: ACTIVE | COMMUNITY
Start: 1900-01-01

## 2025-07-09 RX ORDER — TRAZODONE HYDROCHLORIDE 50 MG/1
1 TABLET AT BEDTIME AS NEEDED TABLET ORAL ONCE A DAY
Status: ACTIVE | COMMUNITY

## 2025-07-09 RX ORDER — FEXOFENADINE HCL 60 MG/1
1 TABLET TABLET, FILM COATED ORAL TWICE A DAY
Status: ON HOLD | COMMUNITY

## 2025-07-09 RX ORDER — ALPRAZOLAM 0.5 MG/1
1 TABLET TABLET ORAL TWICE A DAY
Status: ACTIVE | COMMUNITY

## 2025-07-09 RX ORDER — NORTRIPTYLINE HYDROCHLORIDE 10 MG/1
1 CAPSULE CAPSULE ORAL ONCE A DAY
Qty: 30 | Refills: 6 | Status: ON HOLD | COMMUNITY

## 2025-07-09 RX ORDER — PANTOPRAZOLE SODIUM 40 MG/1
1 TABLET TABLET, DELAYED RELEASE ORAL ONCE A DAY
Qty: 30 | Refills: 5 | Status: ACTIVE | COMMUNITY
Start: 2023-07-21

## 2025-07-09 NOTE — HPI-TODAY'S VISIT:
7/9/25 - Pt doing well.  Labs and U/S reviewed.  Pt concerned.  Was told by PCP that she had Hep B, but no evidence available to me. --------------- 6/23/25 - 56 yo VF here for evaluation of elevated AFP to 15, and has HBV RNA negative, with normal LFTs and undetectable viral load.  LFTs normal.  Was told by PCP to go see GI as she might have cancer.  Pt is very anxious about this.  Pt had Hep BsAb positive.  HCV Ab negative.  No hx of treatment. No abd pain, N/V, jaundice, weight loss. ---------------- 12/6/24 - 55 yo VF here for symptoms of GERD.  Pt's daughter dx'd 10/2/24 with stage 3B immature teratoma, resected at [facility], and is getting 3 rounds of chemorx at [place].  Pt under a lot of stress with this.  Having chest discomfort and tachycardia, for which she was given metoprolol.  She complains of regurgitation.  Labs reviewed. - - - - - - - - - 2/1/24 - Pt here for follow up.  Stopped Paxil, and about to start fluoxetine.  Has occ bouts of anxiety.  Currently seeing doctors almost daily, and notes her thyroid is difficult to manage.  Dx'd with mild sleep apnea.  Using .  Using trazodone, 75 mg qHS, to sleep.  Weight up 2# since last visit. Has globus sensation at times.  Uses occ Xanax. - - - - - - - - - - - - -- 6/30/23 - Pt here for 2 wk hx of throat burning and like something is caught in throat.  Feels better if eats ~ 2 hrs.  Occ heartburn, and is burping a lot.  Has also noted abd bloating, better with famotidine.  Pt dealing with father's recent dx of lung cancer.  On trazodone and Paxil, and sleeping ~ 7 hours/d. 12/16/22 - Pt here for follow up.  Barium swallow is doing well.  She was found to be hyperthyroid on replacement, and dose was dropped and is being adjusted.  With the drop of her dosage, a lot of her symptoms have resolved.  Her UGI symptoms are resolved.  She has noted some hair loss. - - - - - - - - - - - - - - - - - - - - - - - -  10/18/22 - Pt here for follow up.  Had some chest symptoms and possible arrythmia, and is currently on a heart monitor.  She has seen Dr. Dolan again, as well as seeing Cardiology and Pulmonary.  She has a sensation of food in her throat, as well as food getting stuck in her chest.  She also complains of SOB, and fatigue.  She saw ENT and was told she had reflux.  PPIs have not helped.  She has burping frequently. ****************************